# Patient Record
Sex: FEMALE | Race: WHITE | HISPANIC OR LATINO | URBAN - METROPOLITAN AREA
[De-identification: names, ages, dates, MRNs, and addresses within clinical notes are randomized per-mention and may not be internally consistent; named-entity substitution may affect disease eponyms.]

---

## 2020-08-23 ENCOUNTER — APPOINTMENT (OUTPATIENT)
Dept: RADIOLOGY | Facility: MEDICAL CENTER | Age: 50
End: 2020-08-23
Attending: EMERGENCY MEDICINE
Payer: MEDICAID

## 2020-08-23 ENCOUNTER — HOSPITAL ENCOUNTER (EMERGENCY)
Facility: MEDICAL CENTER | Age: 50
End: 2020-08-23
Attending: EMERGENCY MEDICINE
Payer: MEDICAID

## 2020-08-23 VITALS
WEIGHT: 197 LBS | BODY MASS INDEX: 36.25 KG/M2 | TEMPERATURE: 98.1 F | SYSTOLIC BLOOD PRESSURE: 130 MMHG | HEIGHT: 62 IN | DIASTOLIC BLOOD PRESSURE: 81 MMHG | RESPIRATION RATE: 17 BRPM | OXYGEN SATURATION: 98 % | HEART RATE: 73 BPM

## 2020-08-23 DIAGNOSIS — S93.401A SPRAIN OF RIGHT ANKLE, UNSPECIFIED LIGAMENT, INITIAL ENCOUNTER: ICD-10-CM

## 2020-08-23 PROCEDURE — A9270 NON-COVERED ITEM OR SERVICE: HCPCS | Performed by: EMERGENCY MEDICINE

## 2020-08-23 PROCEDURE — 73610 X-RAY EXAM OF ANKLE: CPT | Mod: RT

## 2020-08-23 PROCEDURE — 99284 EMERGENCY DEPT VISIT MOD MDM: CPT

## 2020-08-23 PROCEDURE — 700102 HCHG RX REV CODE 250 W/ 637 OVERRIDE(OP): Performed by: EMERGENCY MEDICINE

## 2020-08-23 RX ORDER — IBUPROFEN 600 MG/1
600 TABLET ORAL ONCE
Status: COMPLETED | OUTPATIENT
Start: 2020-08-23 | End: 2020-08-23

## 2020-08-23 RX ORDER — ACETAMINOPHEN 325 MG/1
650 TABLET ORAL ONCE
Status: COMPLETED | OUTPATIENT
Start: 2020-08-23 | End: 2020-08-23

## 2020-08-23 RX ADMIN — IBUPROFEN 600 MG: 600 TABLET, FILM COATED ORAL at 21:31

## 2020-08-23 RX ADMIN — ACETAMINOPHEN 650 MG: 325 TABLET, FILM COATED ORAL at 21:31

## 2020-08-24 NOTE — ED PROVIDER NOTES
"ED Provider Note    CHIEF COMPLAINT  Chief Complaint   Patient presents with   • T-5000 GLF     right ankle pain/ swelling after GLF today, while stepping down from stairs.Denies hitting head. Mild swelling right lateral malleoulus. Distal CMS intact.        HPI  Erin Hawkins is a 49 y.o. female who presents to the emergency department chief complaint of right ankle pain and swelling.  She states she was [place] when she had the bottom stair actually got to the floor she felt her right ankle got from under her and felt a pop she has not been able to bear weight since.  Pain is currently 7 out of 10 and worse with movement.  She denies knee pain hip pain or hitting her head.    REVIEW OF SYSTEMS  Positives as above. Pertinent negatives include weakness numbness tingling hip pain knee pain hip pain loss of consciousness  All other review of systems are negative    PAST MEDICAL HISTORY       SOCIAL HISTORY  Social History     Tobacco Use   • Smoking status: Never Smoker   • Smokeless tobacco: Never Used   Substance and Sexual Activity   • Alcohol use: Not Currently   • Drug use: Never   • Sexual activity: Not on file       SURGICAL HISTORY   has a past surgical history that includes appendectomy and cholecystectomy.    CURRENT MEDICATIONS  Home Medications    **Home medications have not yet been reviewed for this encounter**         ALLERGIES  No Known Allergies    PHYSICAL EXAM  VITAL SIGNS: /102   Pulse 84   Temp 36.6 °C (97.9 °F) (Temporal)   Resp 16   Ht 1.575 m (5' 2\")   Wt 89.4 kg (197 lb)   SpO2 97%   BMI 36.03 kg/m²    Pulse ox interpretation: I interpret this pulse ox as normal.  Constitutional: Alert in no apparent distress.  HENT: Normocephalic, Atraumatic, MMM  Eyes: PERound. Conjunctiva normal, non-icteric.   EXT: Swelling and tenderness palpation of the right posterior lateral malleolus no midfoot tenderness DP pulse 2+ no proximal tib-fib tenderness " "full range of motion at the knee sensation intact light touch distally  Skin: Warm, Dry, No erythema, No rash.   Neurologic: Alert and oriented, Grossly non-focal.       DIFFERENTIAL DIAGNOSIS AND WORK UP PLAN    This is a 49 y.o. female who presents with swelling and oral of the ankle likely sprain strain or fibular fracture she does have point tenderness and posterior malleolus and meets the [place] ankle rules for imaging.  She was given an ice pack elevation ibuprofen and Tylenol    Pertinent Lab Findings  Labs Reviewed - No data to display    Radiology  DX-ANKLE 3+ VIEWS RIGHT   Final Result         1.  No acute traumatic bony injury.   2.  Pes planus           The radiologist's interpretation of all radiological studies have been reviewed by me.    COURSE & MEDICAL DECISION MAKING  Pertinent Labs & Imaging studies reviewed. (See chart for details)    10:45 PM  Reassessed patient at the bedside she is resting comfortably we discussed rice treatment crutches and Ace wrap as well as progressively improved weightbearing over the next several days.  Patient feels comfortable understands that she has a sprain instead of a fracture and return to the ED for any new or worsening issues    /81   Pulse 73   Temp 36.7 °C (98.1 °F)   Resp 17   Ht 1.575 m (5' 2\")   Wt 89.4 kg (197 lb)   SpO2 98%   BMI 36.03 kg/m²       I verified that the patient was wearing a mask and I was wearing appropriate PPE every time I entered the room. The patient's mask was on the patient at all times during my encounter except for a brief view of the oropharynx.    The patient will return for new or worsening symptoms and is stable at the time of discharge.    The patient is referred to a primary physician for blood pressure management, diabetic screening, and for all other preventative health concerns.    DISPOSITION:  Patient will be discharged home in stable condition.    FOLLOW UP:  [facility], Emergency " Dept  [address] [facility] [id number]  [phone]  In 1 week  If symptoms worsen      OUTPATIENT MEDICATIONS:  There are no discharge medications for this patient.          FINAL IMPRESSION  1. Sprain of right ankle, unspecified ligament, initial encounter                Electronically signed by: Paulette West M.D., 8/23/2020 9:06 PM    This dictation has been created using voice recognition software and/or scribes. The accuracy of the dictation is limited by the abilities of the software and the expertise of the scribes. I expect there may be some errors of grammar and possibly content. I made every attempt to manually correct the errors within my dictation. However, errors related to voice recognition software and/or scribes may still exist and should be interpreted within the appropriate context.

## 2020-08-24 NOTE — ED NOTES
Erin Hawkins is being discharged from the Emergency Department in stable condition. Discharge and follow up instructions were given to patient.   Erin Hawkins is alert and oriented and verbalizes understanding. VSS. The patient ambulates with steady gait.

## 2020-08-24 NOTE — ED TRIAGE NOTES
Chief Complaint   Patient presents with   • T-5000 GLF     right ankle pain/ swelling after GLF today, while stepping down from stairs.Denies hitting head. Mild swelling right lateral malleoulus. Distal CMS intact.      Pt BIB [facility] via w/c to triage for above complaints. Educated on triage process, encouraged to inform staff of any changes.